# Patient Record
Sex: FEMALE | Race: WHITE | URBAN - METROPOLITAN AREA
[De-identification: names, ages, dates, MRNs, and addresses within clinical notes are randomized per-mention and may not be internally consistent; named-entity substitution may affect disease eponyms.]

---

## 2020-10-12 ENCOUNTER — OFFICE VISIT (OUTPATIENT)
Dept: URBAN - METROPOLITAN AREA CLINIC 71 | Facility: CLINIC | Age: 80
End: 2020-10-12
Payer: MEDICARE

## 2020-10-12 DIAGNOSIS — H25.12 NUCLEAR SCLEROSIS CATARACT OF LEFT EYE: Primary | ICD-10-CM

## 2020-10-12 DIAGNOSIS — H40.31X3 GLAUCOMA SECONDARY TO EYE TRAUMA, RIGHT EYE, SEVERE STAGE: ICD-10-CM

## 2020-10-12 DIAGNOSIS — H52.03 HYPERMETROPIA, BILATERAL: ICD-10-CM

## 2020-10-12 DIAGNOSIS — H17.89 OTHER CORNEAL SCARS AND OPACITIES: ICD-10-CM

## 2020-10-12 DIAGNOSIS — H43.22 CRYSTALLINE DEPOSITS IN VITREOUS BODY, LEFT EYE: ICD-10-CM

## 2020-10-12 PROCEDURE — 92004 COMPRE OPH EXAM NEW PT 1/>: CPT | Performed by: OPTOMETRIST

## 2020-10-12 RX ORDER — TIMOLOL MALEATE 5 MG/ML
0.5 % SOLUTION/ DROPS OPHTHALMIC
Qty: 5 | Refills: 5 | Status: INACTIVE
Start: 2020-10-12 | End: 2021-06-11

## 2020-10-12 ASSESSMENT — INTRAOCULAR PRESSURE
OS: 16
OD: 70

## 2020-10-12 ASSESSMENT — VISUAL ACUITY: OS: 20/20

## 2020-10-12 NOTE — IMPRESSION/PLAN
Impression: Crystalline deposits in vitreous body, left eye: H43.22. mild Plan: monitor. call if worsens.

## 2020-10-12 NOTE — IMPRESSION/PLAN
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 Plan: Cataracts affecting vision some, but no surgery is currently recommended. The patient will monitor vision changes and contact us with any decrease in vision. Continue to monitor.

## 2020-10-12 NOTE — IMPRESSION/PLAN
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 central calcification/scarring OD. Penetrating corneal injury age 5. NLP OD. Eye comfortable. IOP measured high due to calcification. Cornea not edematous. Plan: Discussed. Continue to monitor. Thick lubricant OD for comfort PRN. Discussed possible need for scraping if discomfort not relieved by lubricants.

## 2020-10-12 NOTE — IMPRESSION/PLAN
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 NLP OD. Taking timolol BID OD. Plan: Discussed. Continue timolol BID OD. Continue to monitor. Measure with Icare above or below calcification.

## 2021-06-11 ENCOUNTER — OFFICE VISIT (OUTPATIENT)
Dept: URBAN - METROPOLITAN AREA CLINIC 71 | Facility: CLINIC | Age: 81
End: 2021-06-11
Payer: MEDICARE

## 2021-06-11 PROCEDURE — 99214 OFFICE O/P EST MOD 30 MIN: CPT | Performed by: OPTOMETRIST

## 2021-06-11 RX ORDER — TIMOLOL MALEATE 5 MG/ML
0.5 % SOLUTION/ DROPS OPHTHALMIC
Qty: 5 | Refills: 5 | Status: ACTIVE
Start: 2021-06-11

## 2021-06-11 ASSESSMENT — INTRAOCULAR PRESSURE
OD: 70
OS: 16
OD: 44

## 2021-06-11 NOTE — IMPRESSION/PLAN
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 s/p trab OD (bleb). NLP OD. Taking timolol BID OD. Pt reports increase in pain OD. Mild ciliary flush. Likely related to elevated IOP. Symptoms don't match calcific band keratopathy. Pt inquired about removing the eye. Plan: Continue timolol BID OD. Continue to monitor. Measure with Icare above or below calcification when checking pressure. Due to pain, likely due to elevated pressure, would like to refer to Glaucoma specialist as soon as possible for further evaluation and possible treatment. Recommend Dr. India Jernigan, Dr. Cielo Berman or Dr. Madalyn Emmanuel. Pt did not want to travel to [facility], so refer to Dr. Christina Mehta for second opinion and to confirm referral to glaucoma specialist. If Dr. Christina Mehta does not do eviscerations, then may as well refer to Dr. Carlito Payne or [facility] for 2nd opinion.

## 2021-06-11 NOTE — IMPRESSION/PLAN
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 central calcification/scarring OD. Penetrating corneal injury age 5. NLP OD. Cornea not edematous. Blinking and lubricants do not affect the pain. Pain feels deeper than surface of eye. Plan: Discussed. Continue to monitor. Thick lubricant OD for comfort PRN. Discussed possible need for scraping if pain likely from this keratopathy.

## 2021-12-13 ENCOUNTER — OFFICE VISIT (OUTPATIENT)
Dept: URBAN - METROPOLITAN AREA CLINIC 71 | Facility: CLINIC | Age: 81
End: 2021-12-13
Payer: MEDICARE

## 2021-12-13 DIAGNOSIS — H18.421 BAND KERATOPATHY, RIGHT EYE: ICD-10-CM

## 2021-12-13 PROCEDURE — 99213 OFFICE O/P EST LOW 20 MIN: CPT | Performed by: OPTOMETRIST

## 2021-12-13 ASSESSMENT — INTRAOCULAR PRESSURE
OD: 49
OS: 18

## 2021-12-13 NOTE — IMPRESSION/PLAN
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 central calcification/scarring OD. Penetrating corneal injury age 5. NLP OD. Cornea not edematous. Occasional pain feels deeper than surface of eye. Pain is not problematic. Plan: Discussed. Continue to monitor. Thick lubricant OD for comfort PRN. Discussed possible need for scraping if pain likely from this keratopathy.

## 2021-12-13 NOTE — IMPRESSION/PLAN
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 s/p trab OD (bleb). NLP OD. Taking timolol BID OD. Pt reports occasional pain OD, but mostly concerned w/swelling. Likely related to elevated IOP (or calcification). Symptoms don't match calcific band keratopathy. Pt previously inquired about removing the eye, but has decided to hold off for now. Pt was referred to Dr. Courtney Camacho to discuss, but pt did not go. Plan: Continue Timolol BID OD. Continue to monitor. Measure with Icare above or below calcification when checking pressure OD. Goldmann not necessary. Occasional pain likely due to elevated pressure. Pt to call if becomes problematic or uncomfortable. Can consider consult with Dr. Army Ayala for evisceration if symptoms increase. Pt unwilling to travel to [facility] area for anything.

## 2021-12-13 NOTE — IMPRESSION/PLAN
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 Pt was not dilated today. Plan: Continue to observe.

## 2022-06-13 ENCOUNTER — OFFICE VISIT (OUTPATIENT)
Dept: URBAN - METROPOLITAN AREA CLINIC 71 | Facility: CLINIC | Age: 82
End: 2022-06-13
Payer: MEDICARE

## 2022-06-13 DIAGNOSIS — H18.421 BAND KERATOPATHY, RIGHT EYE: ICD-10-CM

## 2022-06-13 DIAGNOSIS — H17.89 OTHER CORNEAL SCARS AND OPACITIES: ICD-10-CM

## 2022-06-13 DIAGNOSIS — H25.12 NUCLEAR SCLEROSIS CATARACT OF LEFT EYE: ICD-10-CM

## 2022-06-13 DIAGNOSIS — H40.31X3 GLAUCOMA SECONDARY TO EYE TRAUMA, RIGHT EYE, SEVERE STAGE: Primary | ICD-10-CM

## 2022-06-13 PROCEDURE — 92014 COMPRE OPH EXAM EST PT 1/>: CPT | Performed by: OPTOMETRIST

## 2022-06-13 ASSESSMENT — INTRAOCULAR PRESSURE
OD: 79
OD: 39
OS: 17

## 2022-06-13 NOTE — IMPRESSION/PLAN
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 s/p trab OD (bleb). NLP OD. Taking timolol BID OD. Pt reports occasional pain and burning sensation OD. Likely related to dryness or elevated IOP (or calcification). Symptoms don't match calcific band keratopathy. Pt previously inquired about removing the eye, but has decided to hold off for now. Pt was referred to Dr. Carl Guerrero previously to discuss, but pt did not go. Plan: Continue Timolol BID OD. Continue to monitor. Measure IOP with Icare only above or below calcification when checking pressure OD. Goldmann not necessary. Occasional pain likely due to elevated pressure or dryness. Pt to call if becomes problematic or uncomfortable. Can consider consult with Dr. Addison Fabry for evisceration if symptoms increase. Pt still unwilling to travel to [facility] area for anything.

## 2022-06-13 NOTE — IMPRESSION/PLAN
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 central calcification/scarring OD. Penetrating corneal injury age 5. NLP OD. Cornea not edematous. Occasional pain feels deeper than surface of eye. Pain is not problematic. Plan: Discussed. Continue to monitor. Again, discuss using a thick lubricant OD for comfort PRN. Discussed possible need for scraping if pain likely from this keratopathy. Pt not wanting to proceed with any procedures or further treatment at this time. Pt will call if becomes more painful/problematic.

## 2022-06-13 NOTE — IMPRESSION/PLAN
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 Plan: Cataract affecting vision some, but no surgery is currently recommended. The patient will monitor vision changes and contact us with any decrease in vision. Continue to monitor.

## 2023-01-13 ENCOUNTER — OFFICE VISIT (OUTPATIENT)
Dept: URBAN - METROPOLITAN AREA CLINIC 71 | Facility: CLINIC | Age: 83
End: 2023-01-13
Payer: COMMERCIAL

## 2023-01-13 DIAGNOSIS — H17.89 OTHER CORNEAL SCARS AND OPACITIES: ICD-10-CM

## 2023-01-13 DIAGNOSIS — H40.31X3 GLAUCOMA SECONDARY TO EYE TRAUMA, RIGHT EYE, SEVERE STAGE: Primary | ICD-10-CM

## 2023-01-13 DIAGNOSIS — H18.421 BAND KERATOPATHY, RIGHT EYE: ICD-10-CM

## 2023-01-13 DIAGNOSIS — H25.12 NUCLEAR SCLEROSIS CATARACT OF LEFT EYE: ICD-10-CM

## 2023-01-13 PROCEDURE — 99213 OFFICE O/P EST LOW 20 MIN: CPT | Performed by: OPTOMETRIST

## 2023-01-13 RX ORDER — TIMOLOL MALEATE 5 MG/ML
0.5 % SOLUTION/ DROPS OPHTHALMIC
Qty: 15 | Refills: 0 | Status: ACTIVE
Start: 2023-01-13

## 2023-01-13 ASSESSMENT — INTRAOCULAR PRESSURE
OD: 29
OS: 16

## 2023-01-13 ASSESSMENT — KERATOMETRY: OS: 44.63

## 2023-01-13 NOTE — IMPRESSION/PLAN
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 central calcification/scarring OD. Penetrating corneal injury age 5. NLP OD. Eye mostly comfortable. Some discomfort, mostly noticed after extended reading/computer use. IOP measured high due to calcification. Cornea not edematous. Plan: Discussed. Continue to monitor. Thick lubricant OD for comfort PRN. Can consider patching right eye when reading or on the computer for extended period of time. Discussed possible need for scraping if discomfort not relieved by lubricants.

## 2023-01-13 NOTE — IMPRESSION/PLAN
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 s/p trab OD (bleb). NLP OD. Taking timolol BID OD. Pt admits she occasionally misses the PM drop. Pt reports mild, intermittent pain and burning sensation OD. Likely related to dryness or elevated IOP (or calcification). Symptoms don't match calcific band keratopathy. Pt previously inquired about removing the eye, but has decided to hold off for now. Pt was referred to Dr. Mariela Breaux previously to discuss, but pt did not go. Plan: Continue Timolol BID OD. Continue to monitor. Measure IOP with Icare only above or below calcification when checking pressure OD. Goldmann not necessary. Occasional pain likely due to elevated pressure or dryness. Pt to call if becomes problematic or uncomfortable. Can still consider consult with Dr. Kristofer Blackman for evisceration if symptoms increase, but pt not interested at this time. Pt still unwilling to travel to [facility] area for anything.

## 2023-07-14 ENCOUNTER — OFFICE VISIT (OUTPATIENT)
Dept: URBAN - METROPOLITAN AREA CLINIC 71 | Facility: CLINIC | Age: 83
End: 2023-07-14
Payer: MEDICARE

## 2023-07-14 DIAGNOSIS — H40.31X3 GLAUCOMA SECONDARY TO EYE TRAUMA, RIGHT EYE, SEVERE STAGE: Primary | ICD-10-CM

## 2023-07-14 DIAGNOSIS — H17.89 OTHER CORNEAL SCARS AND OPACITIES: ICD-10-CM

## 2023-07-14 DIAGNOSIS — H43.22 CRYSTALLINE DEPOSITS IN VITREOUS BODY, LEFT EYE: ICD-10-CM

## 2023-07-14 DIAGNOSIS — H25.12 NUCLEAR SCLEROSIS CATARACT OF LEFT EYE: ICD-10-CM

## 2023-07-14 DIAGNOSIS — H18.421 BAND KERATOPATHY, RIGHT EYE: ICD-10-CM

## 2023-07-14 PROCEDURE — 99214 OFFICE O/P EST MOD 30 MIN: CPT | Performed by: OPTOMETRIST

## 2023-07-14 ASSESSMENT — INTRAOCULAR PRESSURE
OS: 13
OD: 30

## 2023-07-14 NOTE — IMPRESSION/PLAN
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 s/p trab OD (bleb). NLP OD. Taking timolol BID OD. Pt reports mild, intermittent pain and burning sensation OD. Likely related to dryness or elevated IOP (or calcification). Symptoms don't match calcific band keratopathy. Pt previously inquired about removing the eye, but has decided to still hold off for now. Pt was referred to Dr. Cristiana Alex previously to discuss, but pt did not go. Plan: Discussed. Continue Timolol BID OD. Continue to monitor. Measure IOP with Icare only above or below calcification when checking pressure OD. Goldmann not necessary. Occasional pain likely due to elevated pressure or dryness. Pt to call if becomes problematic or uncomfortable. Can still consider consult with Dr. Irene Lopes for evisceration if symptoms increase, but pt not interested at this time. Pt still unwilling to travel to [facility] area for anything.

## 2023-07-14 NOTE — IMPRESSION/PLAN
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 central calcification/scarring OD. Penetrating corneal injury age 5. NLP OD. Eye still mostly comfortable. Some discomfort, mostly noticed after extended reading/computer use. IOP measured high due to calcification. Cornea not edematous. Plan: Continue to monitor. Use thick lubricant OD for comfort PRN. Can consider patching right eye when reading or on the computer for extended period of time. Discussed possible need for scraping if discomfort not relieved by lubricants.

## 2023-07-14 NOTE — IMPRESSION/PLAN
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 Call if worsens.

## 2023-07-14 NOTE — IMPRESSION/PLAN
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 Plan: Cataract may be affecting vision some, but no surgery is currently recommended. The patient will monitor vision changes and contact us with any decrease in vision. Continue to monitor.

## 2024-07-16 ENCOUNTER — OFFICE VISIT (OUTPATIENT)
Dept: URBAN - METROPOLITAN AREA CLINIC 71 | Facility: CLINIC | Age: 84
End: 2024-07-16
Payer: MEDICARE

## 2024-07-16 DIAGNOSIS — H43.22 CRYSTALLINE DEPOSITS IN VITREOUS BODY, LEFT EYE: ICD-10-CM

## 2024-07-16 DIAGNOSIS — H25.12 NUCLEAR SCLEROSIS CATARACT OF LEFT EYE: Primary | ICD-10-CM

## 2024-07-16 DIAGNOSIS — H40.31X3 GLAUCOMA SECONDARY TO EYE TRAUMA, RIGHT EYE, SEVERE STAGE: ICD-10-CM

## 2024-07-16 DIAGNOSIS — H18.421 BAND KERATOPATHY, RIGHT EYE: ICD-10-CM

## 2024-07-16 PROCEDURE — 99213 OFFICE O/P EST LOW 20 MIN: CPT

## 2024-07-16 RX ORDER — TIMOLOL MALEATE 5 MG/ML
0.5 % SOLUTION/ DROPS OPHTHALMIC
Qty: 15 | Refills: 1 | Status: ACTIVE
Start: 2024-07-16

## 2024-07-16 ASSESSMENT — INTRAOCULAR PRESSURE: OS: 21

## 2024-12-23 ENCOUNTER — OFFICE VISIT (OUTPATIENT)
Dept: URBAN - METROPOLITAN AREA CLINIC 71 | Facility: CLINIC | Age: 84
End: 2024-12-23
Payer: MEDICARE

## 2024-12-23 DIAGNOSIS — H40.31X3 GLAUCOMA SECONDARY TO EYE TRAUMA, RIGHT EYE, SEVERE STAGE: Primary | ICD-10-CM

## 2024-12-23 DIAGNOSIS — H25.12 NUCLEAR SCLEROSIS CATARACT OF LEFT EYE: ICD-10-CM

## 2024-12-23 PROCEDURE — 99213 OFFICE O/P EST LOW 20 MIN: CPT

## 2024-12-23 PROCEDURE — 92134 CPTRZ OPH DX IMG PST SGM RTA: CPT

## 2024-12-23 PROCEDURE — 92133 CPTRZD OPH DX IMG PST SGM ON: CPT

## 2024-12-23 RX ORDER — TIMOLOL MALEATE 5 MG/ML
0.5 % SOLUTION/ DROPS OPHTHALMIC
Qty: 15 | Refills: 3 | Status: ACTIVE
Start: 2024-12-23

## 2024-12-23 ASSESSMENT — INTRAOCULAR PRESSURE
OD: 49
OS: 18